# Patient Record
Sex: FEMALE | Race: AMERICAN INDIAN OR ALASKA NATIVE
[De-identification: names, ages, dates, MRNs, and addresses within clinical notes are randomized per-mention and may not be internally consistent; named-entity substitution may affect disease eponyms.]

---

## 2018-05-26 ENCOUNTER — HOSPITAL ENCOUNTER (EMERGENCY)
Dept: HOSPITAL 5 - ED | Age: 63
LOS: 1 days | Discharge: PSYCHIATRIC OR SUBSTANCE USE FACILITY | End: 2018-05-27
Payer: SELF-PAY

## 2018-05-26 DIAGNOSIS — E87.6: Primary | ICD-10-CM

## 2018-05-26 DIAGNOSIS — I10: ICD-10-CM

## 2018-05-26 DIAGNOSIS — E86.0: ICD-10-CM

## 2018-05-26 DIAGNOSIS — Z90.49: ICD-10-CM

## 2018-05-26 LAB
ALBUMIN SERPL-MCNC: 3.7 G/DL (ref 3.9–5)
ALT SERPL-CCNC: 16 UNITS/L (ref 7–56)
BACTERIA #/AREA URNS HPF: (no result) /HPF
BASOPHILS # (AUTO): 0 K/MM3 (ref 0–0.1)
BASOPHILS NFR BLD AUTO: 0.4 % (ref 0–1.8)
BILIRUB UR QL STRIP: (no result)
BLOOD UR QL VISUAL: (no result)
BUN SERPL-MCNC: 15 MG/DL (ref 7–17)
BUN/CREAT SERPL: 30 %
CALCIUM SERPL-MCNC: 9.3 MG/DL (ref 8.4–10.2)
EOSINOPHIL # BLD AUTO: 0 K/MM3 (ref 0–0.4)
EOSINOPHIL NFR BLD AUTO: 0.1 % (ref 0–4.3)
HCT VFR BLD CALC: 39.7 % (ref 30.3–42.9)
HEMOLYSIS INDEX: 7
HGB BLD-MCNC: 12.9 GM/DL (ref 10.1–14.3)
LYMPHOCYTES # BLD AUTO: 3.5 K/MM3 (ref 1.2–5.4)
LYMPHOCYTES NFR BLD AUTO: 32.1 % (ref 13.4–35)
MCH RBC QN AUTO: 26 PG (ref 28–32)
MCHC RBC AUTO-ENTMCNC: 33 % (ref 30–34)
MCV RBC AUTO: 81 FL (ref 79–97)
MONOCYTES # (AUTO): 1 K/MM3 (ref 0–0.8)
MONOCYTES % (AUTO): 9.4 % (ref 0–7.3)
MUCOUS THREADS #/AREA URNS HPF: (no result) /HPF
PH UR STRIP: 6 [PH] (ref 5–7)
PLATELET # BLD: 241 K/MM3 (ref 140–440)
RBC # BLD AUTO: 4.9 M/MM3 (ref 3.65–5.03)
RBC #/AREA URNS HPF: 4 /HPF (ref 0–6)
UROBILINOGEN UR-MCNC: 4 MG/DL (ref ?–2)
WBC #/AREA URNS HPF: 7 /HPF (ref 0–6)

## 2018-05-26 PROCEDURE — 36415 COLL VENOUS BLD VENIPUNCTURE: CPT

## 2018-05-26 PROCEDURE — 93010 ELECTROCARDIOGRAM REPORT: CPT

## 2018-05-26 PROCEDURE — 81001 URINALYSIS AUTO W/SCOPE: CPT

## 2018-05-26 PROCEDURE — 99284 EMERGENCY DEPT VISIT MOD MDM: CPT

## 2018-05-26 PROCEDURE — 93005 ELECTROCARDIOGRAM TRACING: CPT

## 2018-05-26 PROCEDURE — 85025 COMPLETE CBC W/AUTO DIFF WBC: CPT

## 2018-05-26 PROCEDURE — 80053 COMPREHEN METABOLIC PANEL: CPT

## 2018-05-26 PROCEDURE — 84484 ASSAY OF TROPONIN QUANT: CPT

## 2018-05-26 NOTE — EMERGENCY DEPARTMENT REPORT
ED General Adult HPI





- General


Chief complaint: Weakness


Stated complaint: DEHYDRATION/[place] 1013


Time Seen by Provider: 05/26/18 20:41


Source: patient, EMS (ems notes not available at time of  chart dictation), RN 

notes reviewed


Mode of arrival: Stretcher


Limitations: No Limitations





- History of Present Illness


Initial comments: 





This is a 62-year-old female who is previously unknown to this provider.  She 

is a psychiatric patient who is sent to the ER for evaluation of poor oral 

intake.  The patient was sent for evaluation of loss of appetite and rapid 

weight loss.  The patient's psychiatric symptoms are not present at this time.  

The patient has been documented to "not taking enough fluids, refused 

supplements, and medications and feeling weak."


The patient to me denies headache, neck pain, chest pain, abdominal pain, 

shortness of breath, homicidality, suicidality.











The patient is able to give me a lucid conversation, and indicates that her 

 Terrance lives at home,  in [place]





-: Gradual


Improves with: none


Worsens with: none


Associated Symptoms: malaise, weakness.  denies: chest pain, cough, diaphoresis

, fever/chills, headaches, loss of appetite, nausea/vomiting, rash, seizure, 

shortness of breath, syncope





- Related Data


 Previous Rx's











 Medication  Instructions  Recorded  Last Taken  Type


 


Magnesium Oxide [Mag-Ox] 400 mg PO QDAY #10 tablet 05/26/18 Unknown Rx


 


Nitrofurantoin Mono/M-Cryst 100 mg PO Q12HR #13 capsule 05/26/18 Unknown Rx





[Macrobid CAP]    


 


Potassium Chloride 20 meq PO BID #14 liquid 05/26/18 Unknown Rx











 Allergies











Allergy/AdvReac Type Severity Reaction Status Date / Time


 


No Known Allergies Allergy   Unverified 05/26/18 19:00














ED Review of Systems


ROS: 


Stated complaint: DEHYDRATION/[place] 1013


Other details as noted in HPI








ED Past Medical Hx





- Past Medical History


Previous Medical History?: Yes


Hx Hypertension: Yes


Hx Psychiatric Treatment: Yes (for hallucinations and paranoia-1013 at 

[facility])





- Surgical History


Past Surgical History?: Yes


Hx Cholecystectomy: Yes





- Social History


Smoking Status: Never Smoker


Substance Use Type: None





- Medications


Home Medications: 


 Home Medications











 Medication  Instructions  Recorded  Confirmed  Last Taken  Type


 


Magnesium Oxide [Mag-Ox] 400 mg PO QDAY #10 tablet 05/26/18  Unknown Rx


 


Nitrofurantoin Mono/M-Cryst 100 mg PO Q12HR #13 capsule 05/26/18  Unknown Rx





[Macrobid CAP]     


 


Potassium Chloride 20 meq PO BID #14 liquid 05/26/18  Unknown Rx














ED Physical Exam





- General


Limitations: No Limitations


General appearance: alert, in no apparent distress





- Head


Head exam: Present: atraumatic, normocephalic





- Eye


Eye exam: Present: normal appearance, EOMI, other (visual acuity intact to 

finger counting, color perception, reading at a close distance).  Absent: 

nystagmus





- ENT


ENT exam: Present: normal exam, normal orophraynx, mucous membranes moist, 

normal external ear exam





- Neck


Neck exam: Present: normal inspection, full ROM





- Respiratory


Respiratory exam: Present: normal lung sounds bilaterally.  Absent: respiratory 

distress





- Cardiovascular


Cardiovascular Exam: Present: regular rate, normal rhythm, normal heart sounds.

  Absent: bradycardia, tachycardia, irregular rhythm, systolic murmur, 

diastolic murmur, rubs, gallop





- GI/Abdominal


GI/Abdominal exam: Present: soft, normal bowel sounds.  Absent: distended, 

tenderness, guarding, rebound, rigid, pulsatile mass





- Extremities Exam


Extremities exam: Present: normal inspection, full ROM, normal capillary 

refill.  Absent: pedal edema, joint swelling, calf tenderness





- Back Exam


Back exam: Present: normal inspection, full ROM.  Absent: tenderness, CVA 

tenderness (R), paraspinal tenderness, vertebral tenderness





- Neurological Exam


Neurological exam: Present: alert (the patient is alert to name, month, year, 

location), oriented X3, CN II-XII intact, normal gait (patient walks with a one-

person assist.), other (Extraocular movements intact.  Tongue midline.  No 

facial droop.  Facial sensation intact to light touch in the V1, V2, V3 

distribution bilaterally.  5 and 5 strength in 4 extremities..  Sensation is 

intact to light touch in 4 extremities.).  Absent: motor sensory deficit





- Psychiatric


Psychiatric exam: Present: normal affect, normal mood





- Skin


Skin exam: Present: warm, dry, intact, normal color.  Absent: rash





ED Course


 Vital Signs











  05/26/18 05/26/18 05/26/18





  18:48 19:03 20:57


 


Temperature 98.0 F  


 


Pulse Rate 71  66


 


Respiratory 16 16 16





Rate   


 


Blood Pressure 150/63  


 


Blood Pressure   121/45





[Left]   


 


O2 Sat by Pulse 100 100 100





Oximetry   














ED Medical Decision Making





- Lab Data


Result diagrams: 


 05/26/18 20:07





 05/26/18 20:07








 Vital Signs











  05/26/18 05/26/18 05/26/18





  18:48 19:03 20:57


 


Temperature 98.0 F  


 


Pulse Rate 71  66


 


Respiratory 16 16 16





Rate   


 


Blood Pressure 150/63  


 


Blood Pressure   121/45





[Left]   


 


O2 Sat by Pulse 100 100 100





Oximetry   











 Lab Results











  05/26/18 05/26/18 05/26/18 Range/Units





  20:07 20:07 20:07 


 


WBC   10.7   (4.5-11.0)  K/mm3


 


RBC   4.90   (3.65-5.03)  M/mm3


 


Hgb   12.9   (10.1-14.3)  gm/dl


 


Hct   39.7   (30.3-42.9)  %


 


MCV   81   (79-97)  fl


 


MCH   26 L   (28-32)  pg


 


MCHC   33   (30-34)  %


 


RDW   15.6 H   (13.2-15.2)  %


 


Plt Count   241   (140-440)  K/mm3


 


Lymph % (Auto)   32.1   (13.4-35.0)  %


 


Mono % (Auto)   9.4 H   (0.0-7.3)  %


 


Eos % (Auto)   0.1   (0.0-4.3)  %


 


Baso % (Auto)   0.4   (0.0-1.8)  %


 


Lymph #   3.5   (1.2-5.4)  K/mm3


 


Mono #   1.0 H   (0.0-0.8)  K/mm3


 


Eos #   0.0   (0.0-0.4)  K/mm3


 


Baso #   0.0   (0.0-0.1)  K/mm3


 


Seg Neutrophils %   58.0   (40.0-70.0)  %


 


Seg Neutrophils #   6.2   (1.8-7.7)  K/mm3


 


Sodium  144    (137-145)  mmol/L


 


Potassium  3.1 L    (3.6-5.0)  mmol/L


 


Chloride  103.4    ()  mmol/L


 


Carbon Dioxide  24    (22-30)  mmol/L


 


Anion Gap  20    mmol/L


 


BUN  15    (7-17)  mg/dL


 


Creatinine  0.5 L    (0.7-1.2)  mg/dL


 


Estimated GFR  > 60    ml/min


 


BUN/Creatinine Ratio  30    %


 


Glucose  101 H    ()  mg/dL


 


Calcium  9.3    (8.4-10.2)  mg/dL


 


Total Bilirubin  0.80    (0.1-1.2)  mg/dL


 


AST  13    (5-40)  units/L


 


ALT  16    (7-56)  units/L


 


Alkaline Phosphatase  60    ()  units/L


 


Troponin T    < 0.010  (0.00-0.029)  ng/mL


 


Total Protein  6.5    (6.3-8.2)  g/dL


 


Albumin  3.7 L    (3.9-5)  g/dL


 


Albumin/Globulin Ratio  1.3    %


 


Urine Color     (Yellow)  


 


Urine Turbidity     (Clear)  


 


Urine pH     (5.0-7.0)  


 


Ur Specific Gravity     (1.003-1.030)  


 


Urine Protein     (Negative)  mg/dL


 


Urine Glucose (UA)     (Negative)  mg/dL


 


Urine Ketones     (Negative)  mg/dL


 


Urine Blood     (Negative)  


 


Urine Nitrite     (Negative)  


 


Urine Bilirubin     (Negative)  


 


Urine Ictotest     (Negative)  


 


Urine Urobilinogen     (<2.0)  mg/dL


 


Ur Leukocyte Esterase     (Negative)  


 


Urine WBC (Auto)     (0.0-6.0)  /HPF


 


Urine RBC (Auto)     (0.0-6.0)  /HPF


 


U Epithel Cells (Auto)     (0-13.0)  /HPF


 


Urine Bacteria (Auto)     (Negative)  /HPF


 


Urine Mucus     /HPF














  05/26/18 Range/Units





  22:30 


 


WBC   (4.5-11.0)  K/mm3


 


RBC   (3.65-5.03)  M/mm3


 


Hgb   (10.1-14.3)  gm/dl


 


Hct   (30.3-42.9)  %


 


MCV   (79-97)  fl


 


MCH   (28-32)  pg


 


MCHC   (30-34)  %


 


RDW   (13.2-15.2)  %


 


Plt Count   (140-440)  K/mm3


 


Lymph % (Auto)   (13.4-35.0)  %


 


Mono % (Auto)   (0.0-7.3)  %


 


Eos % (Auto)   (0.0-4.3)  %


 


Baso % (Auto)   (0.0-1.8)  %


 


Lymph #   (1.2-5.4)  K/mm3


 


Mono #   (0.0-0.8)  K/mm3


 


Eos #   (0.0-0.4)  K/mm3


 


Baso #   (0.0-0.1)  K/mm3


 


Seg Neutrophils %   (40.0-70.0)  %


 


Seg Neutrophils #   (1.8-7.7)  K/mm3


 


Sodium   (137-145)  mmol/L


 


Potassium   (3.6-5.0)  mmol/L


 


Chloride   ()  mmol/L


 


Carbon Dioxide   (22-30)  mmol/L


 


Anion Gap   mmol/L


 


BUN   (7-17)  mg/dL


 


Creatinine   (0.7-1.2)  mg/dL


 


Estimated GFR   ml/min


 


BUN/Creatinine Ratio   %


 


Glucose   ()  mg/dL


 


Calcium   (8.4-10.2)  mg/dL


 


Total Bilirubin   (0.1-1.2)  mg/dL


 


AST   (5-40)  units/L


 


ALT   (7-56)  units/L


 


Alkaline Phosphatase   ()  units/L


 


Troponin T   (0.00-0.029)  ng/mL


 


Total Protein   (6.3-8.2)  g/dL


 


Albumin   (3.9-5)  g/dL


 


Albumin/Globulin Ratio   %


 


Urine Color  Orin  (Yellow)  


 


Urine Turbidity  Clear  (Clear)  


 


Urine pH  6.0  (5.0-7.0)  


 


Ur Specific Gravity  1.024  (1.003-1.030)  


 


Urine Protein  100 mg/dl  (Negative)  mg/dL


 


Urine Glucose (UA)  Neg  (Negative)  mg/dL


 


Urine Ketones  20  (Negative)  mg/dL


 


Urine Blood  Sm  (Negative)  


 


Urine Nitrite  Pos  (Negative)  


 


Urine Bilirubin  Sm  (Negative)  


 


Urine Ictotest  Negative  (Negative)  


 


Urine Urobilinogen  4.0  (<2.0)  mg/dL


 


Ur Leukocyte Esterase  Neg  (Negative)  


 


Urine WBC (Auto)  7.0 H  (0.0-6.0)  /HPF


 


Urine RBC (Auto)  4.0  (0.0-6.0)  /HPF


 


U Epithel Cells (Auto)  1.0  (0-13.0)  /HPF


 


Urine Bacteria (Auto)  2+  (Negative)  /HPF


 


Urine Mucus  3+  /HPF














- EKG Data


-: EKG Interpreted by Me


EKG shows normal: sinus rhythm


Rate: normal





- EKG Data





05/26/18 23:24


Normal sinus, 62 bpm, left axis deviation, left anterior fascicular block, QTC 

prolonged, T wave inversions V2 V3, not having chest pain, not consistent with 

a myocardial infarction





- Medical Decision Making





Differential diagnosis, including the not limited to: Urinary tract infection, 

dehydration, electrolyte derangement














Assessment and plan: 62-year-old female who is sent to the ER by a psychiatric 

hospital for not eating.  On my assessment the patient is afebrile with 

reassuring vital signs, walks with a one-person assist, 


And is alert and oriented to name, place, time, month and location.  The 

patient is also able to tell me the name of her  in the city that she 

lives in.  She is also noticed to be eating, although slowly.  The patient is 

eating and tolerating orals at this time, I does not require medical admission 

at this time, she may require psychiatric optimization to encourage her to eat 

and drink, and I would defer to the psychiatric team for this.














Critical care attestation.: 


If time is entered above; I have spent that time in minutes in the direct care 

of this critically ill patient, excluding procedure time.








ED Disposition


Clinical Impression: 


 Hypokalemia





Disposition: [id number] PSY HOSP/PSY UNIT


Is pt being admited?: No


Does the pt Need Aspirin: No


Condition: Good


Instructions:  Hypokalemia (ED)


Additional Instructions: 


Take medications as directed.  Follow up with her primary medical doctor or 

primary care doctor within the next month.  Return to the ER right away if he 

was, chills, lethargy, irritability, projectile vomiting, change in mental 

status, confusion, inability to tolerate liquids


Referrals: 


DARLING REAVES MD [Primary Care Provider] - 3-5 Days


MILAN DE ANDA MD [Staff Physician] - 3-5 Days

## 2018-05-27 VITALS — DIASTOLIC BLOOD PRESSURE: 61 MMHG | SYSTOLIC BLOOD PRESSURE: 142 MMHG
